# Patient Record
Sex: MALE | Race: WHITE | HISPANIC OR LATINO | URBAN - METROPOLITAN AREA
[De-identification: names, ages, dates, MRNs, and addresses within clinical notes are randomized per-mention and may not be internally consistent; named-entity substitution may affect disease eponyms.]

---

## 2019-12-05 ENCOUNTER — EMERGENCY (EMERGENCY)
Facility: HOSPITAL | Age: 44
LOS: 1 days | Discharge: HOME | End: 2019-12-05
Admitting: EMERGENCY MEDICINE
Payer: COMMERCIAL

## 2019-12-05 VITALS
WEIGHT: 175.05 LBS | SYSTOLIC BLOOD PRESSURE: 134 MMHG | HEART RATE: 96 BPM | HEIGHT: 68 IN | DIASTOLIC BLOOD PRESSURE: 90 MMHG | RESPIRATION RATE: 20 BRPM | TEMPERATURE: 98 F | OXYGEN SATURATION: 95 %

## 2019-12-05 VITALS
OXYGEN SATURATION: 96 % | HEART RATE: 94 BPM | TEMPERATURE: 99 F | DIASTOLIC BLOOD PRESSURE: 92 MMHG | RESPIRATION RATE: 18 BRPM | SYSTOLIC BLOOD PRESSURE: 153 MMHG

## 2019-12-05 LAB
ALBUMIN SERPL ELPH-MCNC: 4 G/DL — SIGNIFICANT CHANGE UP (ref 3.4–5)
ALP SERPL-CCNC: 58 U/L — SIGNIFICANT CHANGE UP (ref 40–120)
ALT FLD-CCNC: 33 U/L — SIGNIFICANT CHANGE UP (ref 12–42)
ANION GAP SERPL CALC-SCNC: 30 MMOL/L — HIGH (ref 9–16)
AST SERPL-CCNC: 32 U/L — SIGNIFICANT CHANGE UP (ref 15–37)
BASOPHILS # BLD AUTO: 0 K/UL — SIGNIFICANT CHANGE UP (ref 0–0.2)
BASOPHILS NFR BLD AUTO: 0 % — SIGNIFICANT CHANGE UP (ref 0–2)
BILIRUB SERPL-MCNC: 0.4 MG/DL — SIGNIFICANT CHANGE UP (ref 0.2–1.2)
BUN SERPL-MCNC: 20 MG/DL — SIGNIFICANT CHANGE UP (ref 7–23)
CALCIUM SERPL-MCNC: 9.3 MG/DL — SIGNIFICANT CHANGE UP (ref 8.5–10.5)
CHLORIDE SERPL-SCNC: 104 MMOL/L — SIGNIFICANT CHANGE UP (ref 96–108)
CO2 SERPL-SCNC: 14 MMOL/L — LOW (ref 22–31)
CREAT SERPL-MCNC: 1.65 MG/DL — HIGH (ref 0.5–1.3)
EOSINOPHIL # BLD AUTO: 0.37 K/UL — SIGNIFICANT CHANGE UP (ref 0–0.5)
EOSINOPHIL NFR BLD AUTO: 2 % — SIGNIFICANT CHANGE UP (ref 0–6)
ETHANOL SERPL-MCNC: <3 MG/DL — SIGNIFICANT CHANGE UP
GLUCOSE SERPL-MCNC: 108 MG/DL — HIGH (ref 70–99)
HCT VFR BLD CALC: 43.2 % — SIGNIFICANT CHANGE UP (ref 39–50)
HCT VFR BLD CALC: 52.8 % — HIGH (ref 39–50)
HGB BLD-MCNC: 14.5 G/DL — SIGNIFICANT CHANGE UP (ref 13–17)
HGB BLD-MCNC: 16.3 G/DL — SIGNIFICANT CHANGE UP (ref 13–17)
LYMPHOCYTES # BLD AUTO: 44 % — SIGNIFICANT CHANGE UP (ref 13–44)
LYMPHOCYTES # BLD AUTO: 8.14 K/UL — HIGH (ref 1–3.3)
MAGNESIUM SERPL-MCNC: 1.9 MG/DL — SIGNIFICANT CHANGE UP (ref 1.6–2.6)
MCHC RBC-ENTMCNC: 30.9 GM/DL — LOW (ref 32–36)
MCHC RBC-ENTMCNC: 31.8 PG — SIGNIFICANT CHANGE UP (ref 27–34)
MCHC RBC-ENTMCNC: 31.8 PG — SIGNIFICANT CHANGE UP (ref 27–34)
MCHC RBC-ENTMCNC: 33.6 GM/DL — SIGNIFICANT CHANGE UP (ref 32–36)
MCV RBC AUTO: 103.1 FL — HIGH (ref 80–100)
MCV RBC AUTO: 94.7 FL — SIGNIFICANT CHANGE UP (ref 80–100)
MONOCYTES # BLD AUTO: 1.11 K/UL — HIGH (ref 0–0.9)
MONOCYTES NFR BLD AUTO: 6 % — SIGNIFICANT CHANGE UP (ref 2–14)
NEUTROPHILS # BLD AUTO: 6.29 K/UL — SIGNIFICANT CHANGE UP (ref 1.8–7.4)
NEUTROPHILS NFR BLD AUTO: 34 % — LOW (ref 43–77)
NRBC # BLD: 0 /100 WBCS — SIGNIFICANT CHANGE UP (ref 0–0)
NRBC # BLD: SIGNIFICANT CHANGE UP /100 WBCS (ref 0–0)
PCP SPEC-MCNC: SIGNIFICANT CHANGE UP
PLATELET # BLD AUTO: 213 K/UL — SIGNIFICANT CHANGE UP (ref 150–400)
PLATELET # BLD AUTO: 259 K/UL — SIGNIFICANT CHANGE UP (ref 150–400)
POTASSIUM SERPL-MCNC: 3.6 MMOL/L — SIGNIFICANT CHANGE UP (ref 3.5–5.3)
POTASSIUM SERPL-SCNC: 3.6 MMOL/L — SIGNIFICANT CHANGE UP (ref 3.5–5.3)
PROT SERPL-MCNC: 8.4 G/DL — HIGH (ref 6.4–8.2)
RBC # BLD: 4.56 M/UL — SIGNIFICANT CHANGE UP (ref 4.2–5.8)
RBC # BLD: 5.12 M/UL — SIGNIFICANT CHANGE UP (ref 4.2–5.8)
RBC # FLD: 14.2 % — SIGNIFICANT CHANGE UP (ref 10.3–14.5)
RBC # FLD: 14.3 % — SIGNIFICANT CHANGE UP (ref 10.3–14.5)
SODIUM SERPL-SCNC: 148 MMOL/L — HIGH (ref 132–145)
WBC # BLD: 12.07 K/UL — HIGH (ref 3.8–10.5)
WBC # BLD: 18.5 K/UL — HIGH (ref 3.8–10.5)
WBC # FLD AUTO: 12.07 K/UL — HIGH (ref 3.8–10.5)
WBC # FLD AUTO: 18.5 K/UL — HIGH (ref 3.8–10.5)

## 2019-12-05 PROCEDURE — 71045 X-RAY EXAM CHEST 1 VIEW: CPT | Mod: 26

## 2019-12-05 PROCEDURE — 93010 ELECTROCARDIOGRAM REPORT: CPT

## 2019-12-05 PROCEDURE — 70450 CT HEAD/BRAIN W/O DYE: CPT | Mod: 26

## 2019-12-05 PROCEDURE — 99285 EMERGENCY DEPT VISIT HI MDM: CPT | Mod: 25

## 2019-12-05 RX ORDER — SODIUM CHLORIDE 9 MG/ML
1000 INJECTION INTRAMUSCULAR; INTRAVENOUS; SUBCUTANEOUS ONCE
Refills: 0 | Status: COMPLETED | OUTPATIENT
Start: 2019-12-05 | End: 2019-12-05

## 2019-12-05 RX ORDER — LEVETIRACETAM 250 MG/1
1000 TABLET, FILM COATED ORAL ONCE
Refills: 0 | Status: COMPLETED | OUTPATIENT
Start: 2019-12-05 | End: 2019-12-05

## 2019-12-05 RX ADMIN — LEVETIRACETAM 440 MILLIGRAM(S): 250 TABLET, FILM COATED ORAL at 15:15

## 2019-12-05 RX ADMIN — LEVETIRACETAM 1000 MILLIGRAM(S): 250 TABLET, FILM COATED ORAL at 15:32

## 2019-12-05 RX ADMIN — Medication 2 MILLIGRAM(S): at 14:45

## 2019-12-05 RX ADMIN — SODIUM CHLORIDE 1000 MILLILITER(S): 9 INJECTION INTRAMUSCULAR; INTRAVENOUS; SUBCUTANEOUS at 15:16

## 2019-12-05 RX ADMIN — SODIUM CHLORIDE 1000 MILLILITER(S): 9 INJECTION INTRAMUSCULAR; INTRAVENOUS; SUBCUTANEOUS at 15:42

## 2019-12-05 NOTE — ED PROVIDER NOTE - CARE PROVIDER_API CALL
Mariah Restrepo)  Neurology  [address], [address] [place] [address]  Phone: [phone]  Fax: [phone]  Follow Up Time: 1-3 Days Mariah Restrepo)  Neurology  [address], [address] [place] [address]  Phone: [phone]  Fax: [phone]  Follow Up Time: 1-3 Days    Toby Nelson)  [address] [address]  Phone: [phone]  Fax: [phone]  Follow Up Time:

## 2019-12-05 NOTE — ED PROVIDER NOTE - CARE PROVIDERS DIRECT ADDRESSES
[email or web address]

## 2019-12-05 NOTE — ED PROVIDER NOTE - PATIENT PORTAL LINK FT
You can access the FollowMyHealth Patient Portal offered by [facility] by registering at the following website: [email or web address]. By joining [employer]’s FollowMyHealth portal, you will also be able to view your health information using other applications (apps) compatible with our system.

## 2019-12-05 NOTE — ED PROVIDER NOTE - PROVIDER TOKENS
PROVIDER:[TOKEN:[[id number]],FOLLOWUP:[1-3 Days]] PROVIDER:[TOKEN:[[id number]],FOLLOWUP:[1-3 Days]],PROVIDER:[TOKEN:[[id number]]]

## 2019-12-05 NOTE — ED PROVIDER NOTE - OBJECTIVE STATEMENT
45 y/o male with PMHx of seizure disorder (taking Keppra) presents to the ED via EMS after having alleged seizure while shopping this afternoon. Per EMS, Pt was screaming loudly in the store, collapsed to the ground, and began convulsing with tonic-clonic activity which was witnessed by bystanders. Pt was post-ictal when EMS arrived, however, became more coherent and able to endorse that he takes Keppra for seizures. Upon arrival to ED, Pt suddenly had a tonic-clonic seizure for which STAT team was called to Pt's bedside. Ativan was administered, and O2 was applied. Pt's seizure activity stopped approximately after 30 seconds. Remainder of history is limited secondary to Pt's current clinical status.

## 2019-12-05 NOTE — ED ADULT NURSE NOTE - SEPSIS REFERENCE DATA FOR CRITERIA 1 WBC
What Is The Reason For Today's Visit?: Full Body Skin Examination
What Is The Reason For Today's Visit? (Being Monitored For X): concerning skin lesions on an annual basis
< 4,000 OR > 12,000

## 2019-12-05 NOTE — ED PROVIDER NOTE - CLINICAL SUMMARY MEDICAL DECISION MAKING FREE TEXT BOX
43 y/o male with history of seizures, on Keppra, BIBA for seizure while shopping this afternoon. Upon arrival, Pt began having active seizure, was given O2, Ativan, and connected to monitor. Seizure lasted for approximately 30 seconds. Labs, including EtOH and UTox, ordered. IV fluids initiated. IV Keppra administered. EKG, CT, and chest x-ray ordered. Will continue to observe and reassess.

## 2019-12-05 NOTE — ED PROVIDER NOTE - UNABLE TO OBTAIN
Urgent need for Intervention Remainder of history is limited secondary to Pt's current clinical status.

## 2019-12-05 NOTE — ED ADULT TRIAGE NOTE - CHIEF COMPLAINT QUOTE
Patient arrives via EMS s/p seizure; patient with history of seizures and HIV, taking daily keppra as prescribed; as per EMS, patient was shopping, when employee heard a scream and stated patient had seizure like activity; patient now A & O x4, FS in field 73; no traumatic injuries; patient with seizure when placed in room; clinically upgraded immediately

## 2019-12-05 NOTE — ED PROVIDER NOTE - PROGRESS NOTE DETAILS
Pt is now A&Ox3 and sitting comfortably in NAD. He states he has been compliant with his Keppra 750mg BID and has not had a seizure in 6 years. He states his seizures first began when he was initially diagnosed with HIV and was immunocompromised. Given the additional Hx, will order CT Head CTH wnl, pt has been at his baseline in the ED per partner, well appearing now with no focal neuro deficits, seizure precautions discussed, repeat cbc with wbc downtrending, likely stress induced acute phase reactant, results, ddx, and f/u plans discussed with pt at bedside, d/c'd home to f/u with PMD and neurology, strict return precautions discussed, prompt return to ER for any worsening or new sx, pt verbalized understanding.

## 2019-12-06 ENCOUNTER — OTHER (OUTPATIENT)
Age: 44
End: 2019-12-06

## 2019-12-06 PROBLEM — Z00.00 ENCOUNTER FOR PREVENTIVE HEALTH EXAMINATION: Status: ACTIVE | Noted: 2019-12-06

## 2019-12-12 DIAGNOSIS — R56.9 UNSPECIFIED CONVULSIONS: ICD-10-CM

## 2020-07-27 NOTE — ED ADULT NURSE NOTE - NS ED NURSE RECORD ANOTHER HT AND WT
UROLOGY CONSULTATION        I have been asked to see this patient by Arthur Up MD for renal mass.  A copy of this note has been sent to Arthur Up MD.     I have reviewed the nurse/MA notes and assessment and agree.      UROLOGY CHIEF COMPLAINT   No chief complaint on file.      UROLOGY HISTORY OF PRESENT ILLNESS    Ms. Jose Raul Colindres is a 45 year old female *** who presents with renal mass.    Patient presents with a *** cm {R/L vasiliy:046674} renal mass.  This is an incidentally *** symptomatically *** detected mass which was found at the time of cross-sectional imaging obtained for ***.  Patient denies hematuria, flank pain, abdominal pain, bone pain, shortness of breath, cough, fever, weight loss and rash.  *** Patient denies any family history of renal mass or urologic cancer. ***    As detailed below, review of systems is positive for:  ***    Previous abdominal surgery:  ***    Duration:  ***  Timing:  ***  Quality:  ***  Severity:  ***  Modifying Factors:  As noted above - ***  Associated Signs and Symptoms:  As noted above - ***.  No local or systemic symptoms attributable to renal mass. ***    DATA REVIEWED  Patient has had imaging which demonstrates:    Ct Abdomen Pelvis W Wo Contrast    Result Date: 7/3/2020  Narrative: CT ABDOMEN PELVIS W WO CONTRAST DATE:  7/3/2020 4:06 PM HISTORY: right kidney mass COMPARISON: CT chest abdomen and pelvis 11/2/2016 TECHNIQUE: Multiple axial images were acquired through the abdomen and pelvis with the administration of 100 CC of Isovue 300 intravenous contrast. Pre and postcontrast images were generated with early and delayed postcontrast acquisitions. Coronal and sagittal reformatted images were generated for review. FINDINGS: Bowel:   A normal appendix is visualized.  Colonic diverticula are observed without CT evidence of acute diverticulitis. There is moderate colonic stool burden.: Rectum are otherwise unremarkable.  The stomach and small bowel are  unremarkable. Retroperitoneum/mesentery:  No evidence of mesenteric or retroperitoneal lymphadenopathy. No intraperitoneal free fluid or free air. Liver and biliary system:  Normal liver parenchyma. No mass.  Normal biliary system. Spleen:  Negative. Pancreas:  Negative. Adrenal glands:  Negative. Kidneys: Precontrast images reveal no urinary tract calculus. Following contrast administration there is normal and symmetric excretion of contrast by both kidneys. There is no renal mass. The collecting systems are normal in caliber with no filling defect. The ureters are nondilated. The unopacified bladder is negative. There is a subtle exophytic mass projecting from the superior pole lateral cortex of the right kidney. This measures approximately 1.4 cm and does demonstrate evidence of enhancing soft tissue comparing pre and postcontrast acquisitions. Review of electronic medical record demonstrates a report from a CT of the abdomen and pelvis performed 5/30/2020 at [facility] and the [facility] describing similar findings. These images are not currently available for review. This finding is not identified on close inspection of the previous available examination performed 11/2/2016. Pelvis: A left ovarian cyst or dominant follicle is observed which measures 2 cm. The uterus and right adnexa are negative. No free fluid in the pelvis. No adenopathy is identified. Vascular: Negative. Abdominal aorta normal in caliber. Abdominal wall:  No hernia identified. Lower thorax: Subtle areas of multifocal groundglass opacity are significantly improved in comparison to prior exam. Left inferior lingular fibrosis is also noted. Osseous structures and subcutaneous tissues:  Degenerative changes of spine without additional abnormality.     Impression: IMPRESSION: 1. A subtle, 1.4 cm slightly exophytic enhancing soft tissue mass of the right kidney is confirmed. Primary consideration is a small renal cell carcinoma.  The finding is not visible on review of the previous available CT examination that includes the abdomen and pelvis performed 11/2/2016. 2. Colonic diverticula without current, convincing evidence of persistent acute diverticulitis. 3. 2 cm dominant left ovarian follicle or small cyst. 4. Vague multifocal persistent ground glass pulmonary opacities demonstrating significant improvement in comparison to previous available chest CT exam 9/20/2018.       I reviewed imaging:          Recent Labs   Lab 07/03/20  1259  05/28/20  0344   Glucose  --   --  97   Sodium  --   --  143   Potassium  --   --  3.4   Chloride  --   --  110*   BUN  --   --  12   Creatinine 1.00*   < > 0.81   Calcium  --   --  8.6   Albumin  --   --  3.6   GOT/AST  --   --  16   Alkaline Phosphatase  --   --  61   GPT  --   --  24   Anion Gap  --   --  13   BUN/ Creatinine Ratio  --   --  15   Globulin  --   --  3.9   A/G Ratio  --   --  0.9*    < > = values in this interval not displayed.       WBC (K/mcL)   Date Value   05/28/2020 9.3   03/23/2019 8.1     RBC (mil/mcL)   Date Value   05/28/2020 4.80   03/23/2019 4.93     HCT (%)   Date Value   05/28/2020 43.3   03/23/2019 42.5     HGB (g/dL)   Date Value   05/28/2020 14.1   03/23/2019 13.2     PLT (K/mcL)   Date Value   05/28/2020 204   03/23/2019 276   07/06/2013 271       Creatinine (mg/dL)   Date Value   07/03/2020 1.00 (H)   09/24/2018 0.71       GFR Estimate, Non  (no units)   Date Value   09/24/2018 >90     GFR Estimate,  (no units)   Date Value   09/24/2018 >90       Lab Results   Component Value Date    COL YELLOW 09/18/2018    UAPP HAZY 09/18/2018    USPG 1.009 09/18/2018    UPH 6.5 09/18/2018    UPROT NEGATIVE 09/18/2018    UGLU NEGATIVE 09/18/2018    UKET NEGATIVE 09/18/2018    UBILI NEGATIVE 09/18/2018    URBC MODERATE (A) 09/18/2018    UNITR NEGATIVE 09/18/2018    UROB 0.2 09/18/2018    UWBC SMALL (A) 09/18/2018       CULTURE (no units)   Date Value    10/10/2019     <10,000 CFU/mL MIXED BACTERIAL KEVON WITH NO PREDOMINATING TYPE   09/22/2019 >100,000 CFU/mL ESCHERICHIA COLI (P)   09/18/2018     10,000 TO 50,000 CFU/mL MIXED BACTERIAL KEVON WITH NO PREDOMINATING TYPE   09/18/2018 NO GROWTH 5 DAYS.   09/18/2018 NO GROWTH 5 DAYS.         PAST MEDICAL HISTORY      Unspecified sinusitis (chronic)                               Urinary tract infection                                       Degenerated intervertebral disc                               Anxiety                                                       Otitis media                                                    Comment: younger    Fracture                                                        Comment: wrist, ankle (R)    Anemia                                                        Chronic pain                                                    Comment: back sees pain mgmt Dr Swenson    Depression                                                    Scoliosis                                                     Facet joint disease                                           Bronchitis                                                    Use of cane as ambulatory aid                                 Finger fracture                                 05/2018       Severe persistent asthma                                        Comment: Followed by Dr. Jann Jones [phone]     Tobacco use                                                     Comment: Followed by Dr. Jann Jones [phone]    Mixed sleep apnea                                               Comment: Severe HU and CSA - likely needs BiPAP or ASV;               Awaiting Titration Study.  Followed by Dr. Jann Jones [phone]    COPD (chronic obstructive pulmonary disease) (*               Blood clot associated with vein wall inflammat*                 Comment: right leg after knee surgery    PAST SURGICAL HISTORY       ANKLE SURGERY                                   2008            Comment: fracture    SINUS SURGERY                                                   Comment: x4      TYMPANOSTOMY TUBE PLACEMENT                                   TUBAL LIGATION                                  1998          BACK SURGERY                                    2013[facility]         Comment: compressed disc    ARTHROSCOPY KNEE MEDIAL&LATERA                  02/09/2017    FINGER CLOSED REDUCTION W/ PERCUTANEOUS PINNING 05/29/2018      Comment: CRPP L ring finger middle phalanx fracture    SOCIAL HISTORY  Social History     Tobacco Use   • Smoking status: Former Smoker     Packs/day: 0.25     Years: 24.00     Pack years: 6.00     Types: Cigarettes     Start date: 7/25/1999   • Smokeless tobacco: Never Used   Substance Use Topics   • Alcohol use: No     Alcohol/week: 0.0 standard drinks     Frequency: Never     Drinks per session: 1 or 2     Binge frequency: Never       Sexually Active: Yes                 Birth Control/Protection: Surgical      FAMILY HISTORY  Family History   Problem Relation Age of Onset   • Diabetes Mother    • COPD Mother    • Depression Mother    • High cholesterol Mother    • Hypertension Mother    • Cancer Maternal Grandmother         lung       MEDICATIONS    Current Outpatient Medications   Medication Sig   • amphetamine-dextroamphetamine (ADDERALL) 20 MG tablet TAKE 1 TABLET BY MOUTH DAILY.   • NICOTINE STEP 1 21 MG/24HR patch PLACE 1 PATCH ONTO THE SKIN EVERY 24 HOURS   • traZODone (DESYREL) 100 MG tablet Take 2 tablets by mouth nightly.   • sertraline (ZOLOFT) 100 MG tablet Take 2 tablets by mouth every morning.   • mirtazapine (REMERON) 15 MG tablet Take 1 tablet by mouth nightly.   • pregabalin (LYRICA) 300 MG capsule Take 1 capsule by mouth 2 times daily.   • ALPRAZolam (XANAX) 0.5 MG tablet Take 1 tablet by mouth 4 times daily as needed for Anxiety.   • amphetamine-dextroamphetamine (ADDERALL XR) 20 MG 24 hr capsule  One tablet daily. Fill on or before 20.   • amphetamine-dextroamphetamine (ADDERALL XR) 20 MG 24 hr capsule One capsule daily fill on or after 20.   • zaleplon (SONATA) 10 MG capsule One tablet nightly prn sleep. Fill on or after 20.   • diclofenac (VOLTAREN) 1 % gel Apply 4 g topically 4 times daily. Apply to the lower back   • albuterol 108 (90 Base) MCG/ACT inhaler Inhale 2 puffs into the lungs every 4 hours as needed for Shortness of Breath or Wheezing.   • albuterol (VENTOLIN) (2.5 MG/3ML) 0.083% nebulizer solution Take 3 mLs by nebulization every 2 hours as needed for Shortness of Breath or Wheezing.   • montelukast (SINGULAIR) 10 MG tablet Take 1 tablet by mouth nightly.   • predniSONE (DELTASONE) 10 MG tablet Take 1 tablet by mouth 2 times daily. Wean as tolerated.   • tiotropium (SPIRIVA RESPIMAT) 1.25 MCG/ACT inhaler Inhale 2 puffs into the lungs daily.   • fluticasone (FLONASE) 50 MCG/ACT nasal spray Spray 1 spray in each nostril daily.     No current facility-administered medications for this visit.        ALLERGIES    ALLERGIES:   Allergen Reactions   • Fentanyl ANAPHYLAXIS     Patient states she \"\" from receiving dose   • Methadone ANAPHYLAXIS   • Naproxen SHORTNESS OF BREATH   • Codeine    • Morphine ANXIETY     Per pt, gets sick when administered IV and if PO dose too high. Tolerates 15mg tabs   • Penicillins RASH       REVIEW OF SYSTEMS    Obtained by patient intake form and entered by the medical assistant.  (see MA notes).  I have reviewed the pertinent positives and negatives with the patient and agree with documentation entered.      PHYSICAL EXAM    Vital Signs:  Last menstrual period 2020.   General:  The patient is well developed, well nourished, in no acute distress, appears stated age.   Neurologic:  Alert, normal mood and affect.  Skin:  Warm and dry.   Neck:  Symmetric without swelling or tenderness.  No thyroid enlargement.  Respiratory:  Respiratory effort normal.    Cardiovascular:  Regular rate and rhythm.  Lymphatics:  There is no neck or groin adenopathy.   Back:  No costovertebral angle tenderness.   Abdomen:  Bladder and kidneys are nonpalpable.  There are no inguinal or ventral hernias present.  There is no hepatosplenomegaly.  There are no other abdominal masses present.  Stool specimen not indicated.  Extremities:  No swelling or tenderness.     Eyes:  Conjunctivae, eyelids and pupils normal to inspection.  Ears, nose, mouth and throat:  Nasal mucosa, ears, airway and dentition normal to inspection.  Hearing intact.  Psychiatric:  Alert.  Normal mood and affect.    ***        ASSESSMENT  right renal mass  Chronic pain syndrome - recently discharged from her [facility] - new Pain Management Clinic referral pending  Recent diverticulitis    Ct Abdomen Pelvis W Wo Contrast    Result Date: 7/3/2020  Narrative: CT ABDOMEN PELVIS W WO CONTRAST DATE:  7/3/2020 4:06 PM HISTORY: right kidney mass COMPARISON: CT chest abdomen and pelvis 11/2/2016 TECHNIQUE: Multiple axial images were acquired through the abdomen and pelvis with the administration of 100 CC of Isovue 300 intravenous contrast. Pre and postcontrast images were generated with early and delayed postcontrast acquisitions. Coronal and sagittal reformatted images were generated for review. FINDINGS: Bowel:   A normal appendix is visualized.  Colonic diverticula are observed without CT evidence of acute diverticulitis. There is moderate colonic stool burden.: Rectum are otherwise unremarkable.  The stomach and small bowel are unremarkable. Retroperitoneum/mesentery:  No evidence of mesenteric or retroperitoneal lymphadenopathy. No intraperitoneal free fluid or free air. Liver and biliary system:  Normal liver parenchyma. No mass.  Normal biliary system. Spleen:  Negative. Pancreas:  Negative. Adrenal glands:  Negative. Kidneys: Precontrast images reveal no urinary tract calculus. Following contrast administration there  is normal and symmetric excretion of contrast by both kidneys. There is no renal mass. The collecting systems are normal in caliber with no filling defect. The ureters are nondilated. The unopacified bladder is negative. There is a subtle exophytic mass projecting from the superior pole lateral cortex of the right kidney. This measures approximately 1.4 cm and does demonstrate evidence of enhancing soft tissue comparing pre and postcontrast acquisitions. Review of electronic medical record demonstrates a report from a CT of the abdomen and pelvis performed 5/30/2020 at [facility] and the [facility] describing similar findings. These images are not currently available for review. This finding is not identified on close inspection of the previous available examination performed 11/2/2016. Pelvis: A left ovarian cyst or dominant follicle is observed which measures 2 cm. The uterus and right adnexa are negative. No free fluid in the pelvis. No adenopathy is identified. Vascular: Negative. Abdominal aorta normal in caliber. Abdominal wall:  No hernia identified. Lower thorax: Subtle areas of multifocal groundglass opacity are significantly improved in comparison to prior exam. Left inferior lingular fibrosis is also noted. Osseous structures and subcutaneous tissues:  Degenerative changes of spine without additional abnormality.     Impression: IMPRESSION: 1. A subtle, 1.4 cm slightly exophytic enhancing soft tissue mass of the right kidney is confirmed. Primary consideration is a small renal cell carcinoma. The finding is not visible on review of the previous available CT examination that includes the abdomen and pelvis performed 11/2/2016. 2. Colonic diverticula without current, convincing evidence of persistent acute diverticulitis. 3. 2 cm dominant left ovarian follicle or small cyst. 4. Vague multifocal persistent ground glass pulmonary opacities demonstrating significant improvement in comparison  to previous available chest CT exam 2018.       I reviewed imaging:            There is no height or weight on file to calculate BMI.    GFR Estimate, Non  (no units)   Date Value   2018 >90       ALLERGIES:   Allergen Reactions   • Fentanyl ANAPHYLAXIS     Patient states she \"\" from receiving dose   • Methadone ANAPHYLAXIS   • Naproxen SHORTNESS OF BREATH   • Codeine    • Morphine ANXIETY     Per pt, gets sick when administered IV and if PO dose too high. Tolerates 15mg tabs   • Penicillins RASH         PLAN  Check urine protein to creatinine ratio    Patient information:  renal mass, partial nephrectomy    PCP clearance for surgery would be needed    Check with interventional Radiology regarding safety of biopsy and/or ablation as option for this small renal mass    Baseline renal ultrasound would be needed if she elects partial nephrectomy or active surveillance    ***        RENAL MASS DISCUSSION  This patient is found to have a renal mass or complex renal cyst.  Management options were discussed today.  Options include:  Observation, surgery and percutaneous ablation.  The surgical options reviewed are:  Partial nephrectomy, radical nephrectomy, laparoscopic-assisted ablation and percutaneous ablation.  Surgical approaches including robotic assisted laparoscopy and open surgery were discussed.  We discussed how we make management recommendations based on tumor size, overall renal function, split renal function, evidence of tumor spread and medical co-morbidities. It is not a certainty that all renal masses contain carcinoma.  Probability of a carcinoma in a given renal mass is based on multiple variables and these were discussed.  Patient's questions were answered.               Yes